# Patient Record
Sex: FEMALE | Race: WHITE | Employment: UNEMPLOYED | URBAN - METROPOLITAN AREA
[De-identification: names, ages, dates, MRNs, and addresses within clinical notes are randomized per-mention and may not be internally consistent; named-entity substitution may affect disease eponyms.]

---

## 2017-08-09 PROBLEM — G71.11 MYOTONIC DYSTROPHY, TYPE 1 (HCC): Status: ACTIVE | Noted: 2017-08-09

## 2017-08-11 PROBLEM — E03.9 ACQUIRED HYPOTHYROIDISM: Status: ACTIVE | Noted: 2017-08-11

## 2017-10-04 ENCOUNTER — HOSPITAL ENCOUNTER (OUTPATIENT)
Dept: ULTRASOUND IMAGING | Age: 42
Discharge: HOME | End: 2017-10-04
Attending: FAMILY MEDICINE
Payer: SELF-PAY

## 2017-10-04 DIAGNOSIS — R74.8 ELEVATED LIVER ENZYMES: ICD-10-CM

## 2017-10-04 PROCEDURE — 76700 US EXAM ABDOM COMPLETE: CPT

## 2017-10-04 NOTE — PROGRESS NOTES
Please let Mrs. Dickinson know that her liver ultrasound shows a fatty liver. I would like to refer her to gastroenterology.

## 2017-10-06 NOTE — PROGRESS NOTES
I called patient to inform them of abnormal lab/radiology results. Patient verbalized understanding on all. Patient is okay with gastro referral so if that could be placed.

## 2017-10-11 PROBLEM — K76.0 FATTY LIVER: Status: ACTIVE | Noted: 2017-10-11

## 2017-10-11 PROBLEM — E55.9 VITAMIN D DEFICIENCY: Status: ACTIVE | Noted: 2017-10-11
